# Patient Record
Sex: FEMALE | Race: WHITE | URBAN - METROPOLITAN AREA
[De-identification: names, ages, dates, MRNs, and addresses within clinical notes are randomized per-mention and may not be internally consistent; named-entity substitution may affect disease eponyms.]

---

## 2017-03-02 ENCOUNTER — TRANSFERRED RECORDS (OUTPATIENT)
Dept: HEALTH INFORMATION MANAGEMENT | Facility: CLINIC | Age: 72
End: 2017-03-02

## 2017-03-23 ENCOUNTER — TRANSFERRED RECORDS (OUTPATIENT)
Dept: HEALTH INFORMATION MANAGEMENT | Facility: CLINIC | Age: 72
End: 2017-03-23

## 2018-08-07 ENCOUNTER — TRANSFERRED RECORDS (OUTPATIENT)
Dept: HEALTH INFORMATION MANAGEMENT | Facility: CLINIC | Age: 73
End: 2018-08-07

## 2018-09-05 ENCOUNTER — TRANSFERRED RECORDS (OUTPATIENT)
Dept: HEALTH INFORMATION MANAGEMENT | Facility: CLINIC | Age: 73
End: 2018-09-05

## 2018-11-21 ENCOUNTER — TRANSFERRED RECORDS (OUTPATIENT)
Dept: HEALTH INFORMATION MANAGEMENT | Facility: CLINIC | Age: 73
End: 2018-11-21

## 2018-11-27 ENCOUNTER — MEDICAL CORRESPONDENCE (OUTPATIENT)
Dept: HEALTH INFORMATION MANAGEMENT | Facility: CLINIC | Age: 73
End: 2018-11-27

## 2018-11-28 ENCOUNTER — MEDICAL CORRESPONDENCE (OUTPATIENT)
Dept: HEALTH INFORMATION MANAGEMENT | Facility: CLINIC | Age: 73
End: 2018-11-28

## 2019-02-11 ENCOUNTER — MEDICAL CORRESPONDENCE (OUTPATIENT)
Dept: HEALTH INFORMATION MANAGEMENT | Facility: CLINIC | Age: 74
End: 2019-02-11

## 2019-02-25 ENCOUNTER — DOCUMENTATION ONLY (OUTPATIENT)
Dept: CARE COORDINATION | Facility: CLINIC | Age: 74
End: 2019-02-25

## 2019-03-11 DIAGNOSIS — M54.50 LOW BACK PAIN RADIATING TO LEFT LEG: Primary | ICD-10-CM

## 2019-03-11 DIAGNOSIS — M79.605 LOW BACK PAIN RADIATING TO LEFT LEG: Primary | ICD-10-CM

## 2019-10-11 DIAGNOSIS — M79.605 LOW BACK PAIN RADIATING TO LEFT LEG: Primary | ICD-10-CM

## 2019-10-11 DIAGNOSIS — M54.50 LOW BACK PAIN RADIATING TO LEFT LEG: Primary | ICD-10-CM

## 2019-10-13 ASSESSMENT — ENCOUNTER SYMPTOMS
HEARTBURN: 1
ABDOMINAL PAIN: 1
INCREASED ENERGY: 1
BLOATING: 1
DECREASED APPETITE: 0
BOWEL INCONTINENCE: 1
ALTERED TEMPERATURE REGULATION: 1
DOUBLE VISION: 0
INSOMNIA: 1
PANIC: 1
SINUS PAIN: 0
MUSCLE WEAKNESS: 1
EYE PAIN: 0
TROUBLE SWALLOWING: 0
VOMITING: 0
NERVOUS/ANXIOUS: 1
NECK MASS: 0
NIGHT SWEATS: 0
HOARSE VOICE: 0
CHILLS: 1
SMELL DISTURBANCE: 0
DEPRESSION: 1
MUSCLE CRAMPS: 1
STIFFNESS: 1
TASTE DISTURBANCE: 0
JAUNDICE: 0
NECK PAIN: 0
EYE IRRITATION: 0
DECREASED CONCENTRATION: 0
BACK PAIN: 1
POLYDIPSIA: 1
CONSTIPATION: 1
ARTHRALGIAS: 1
DIARRHEA: 1
HALLUCINATIONS: 0
MYALGIAS: 1
RECTAL PAIN: 1
NAUSEA: 1
WEIGHT GAIN: 0
BLOOD IN STOOL: 0
JOINT SWELLING: 0
EYE WATERING: 0
POLYPHAGIA: 0
FEVER: 0
EYE REDNESS: 0
FATIGUE: 1
SORE THROAT: 0
WEIGHT LOSS: 0
SINUS CONGESTION: 0

## 2019-10-17 ENCOUNTER — OFFICE VISIT (OUTPATIENT)
Dept: NEUROSURGERY | Facility: CLINIC | Age: 74
End: 2019-10-17
Payer: COMMERCIAL

## 2019-10-17 ENCOUNTER — ANCILLARY PROCEDURE (OUTPATIENT)
Dept: GENERAL RADIOLOGY | Facility: CLINIC | Age: 74
End: 2019-10-17
Attending: NEUROLOGICAL SURGERY
Payer: COMMERCIAL

## 2019-10-17 VITALS
DIASTOLIC BLOOD PRESSURE: 85 MMHG | HEART RATE: 103 BPM | SYSTOLIC BLOOD PRESSURE: 153 MMHG | OXYGEN SATURATION: 96 % | RESPIRATION RATE: 16 BRPM | BODY MASS INDEX: 28.99 KG/M2 | HEIGHT: 65 IN | WEIGHT: 174 LBS

## 2019-10-17 DIAGNOSIS — M79.7 FIBROMYALGIA: ICD-10-CM

## 2019-10-17 DIAGNOSIS — M79.605 LOW BACK PAIN RADIATING TO LEFT LEG: ICD-10-CM

## 2019-10-17 DIAGNOSIS — M54.50 LOW BACK PAIN RADIATING TO LEFT LEG: ICD-10-CM

## 2019-10-17 DIAGNOSIS — M79.605 LEFT LEG PAIN: Primary | ICD-10-CM

## 2019-10-17 PROBLEM — M19.90 INFLAMMATORY ARTHRITIS: Status: ACTIVE | Noted: 2018-06-27

## 2019-10-17 PROBLEM — M51.369 DDD (DEGENERATIVE DISC DISEASE), LUMBAR: Status: ACTIVE | Noted: 2019-07-22

## 2019-10-17 PROBLEM — M47.819 DEGENERATIVE ARTHROPATHY OF SPINAL FACET JOINT: Status: ACTIVE | Noted: 2019-07-22

## 2019-10-17 PROBLEM — M25.552 LEFT HIP PAIN: Status: ACTIVE | Noted: 2018-06-27

## 2019-10-17 PROBLEM — M25.562 LEFT KNEE PAIN: Status: ACTIVE | Noted: 2018-06-27

## 2019-10-17 PROBLEM — Z79.899 CONTROLLED SUBSTANCE AGREEMENT SIGNED: Status: ACTIVE | Noted: 2019-07-24

## 2019-10-17 PROBLEM — K58.1 IRRITABLE BOWEL SYNDROME WITH CONSTIPATION: Status: ACTIVE | Noted: 2017-04-15

## 2019-10-17 PROBLEM — R11.0 NAUSEA: Status: ACTIVE | Noted: 2018-06-27

## 2019-10-17 RX ORDER — LORAZEPAM 2 MG/1
TABLET ORAL
Refills: 0 | COMMUNITY
Start: 2019-06-08

## 2019-10-17 RX ORDER — GABAPENTIN 300 MG/1
CAPSULE ORAL
Refills: 11 | COMMUNITY
Start: 2019-08-24

## 2019-10-17 RX ORDER — AMITRIPTYLINE HYDROCHLORIDE 150 MG/1
TABLET ORAL
Refills: 1 | COMMUNITY
Start: 2019-07-24

## 2019-10-17 RX ORDER — TRAMADOL HYDROCHLORIDE 50 MG/1
TABLET ORAL
COMMUNITY
Start: 2019-10-14

## 2019-10-17 RX ORDER — SUCRALFATE 1 G/1
TABLET ORAL
Refills: 0 | COMMUNITY
Start: 2019-06-12

## 2019-10-17 RX ORDER — FAMOTIDINE 20 MG
TABLET ORAL
COMMUNITY

## 2019-10-17 RX ORDER — UBIDECARENONE 100 MG
CAPSULE ORAL DAILY
COMMUNITY

## 2019-10-17 RX ORDER — GABAPENTIN 100 MG/1
CAPSULE ORAL
Refills: 3 | COMMUNITY
Start: 2019-07-09

## 2019-10-17 RX ORDER — LORATADINE 10 MG/1
10 TABLET ORAL
COMMUNITY

## 2019-10-17 RX ORDER — ESTRADIOL 1 MG/1
TABLET ORAL
Refills: 3 | COMMUNITY
Start: 2019-08-05

## 2019-10-17 RX ORDER — ESTAZOLAM 2 MG/1
TABLET ORAL
Refills: 1 | COMMUNITY
Start: 2019-10-15

## 2019-10-17 RX ORDER — LEVOTHYROXINE SODIUM 100 UG/1
100 TABLET ORAL
COMMUNITY
Start: 2012-11-18

## 2019-10-17 ASSESSMENT — MIFFLIN-ST. JEOR: SCORE: 1295.14

## 2019-10-17 NOTE — PROGRESS NOTES
"    Neurosurgery Clinic Note    Chief Complaint: left leg pain    History of Present Illness:  It was a pleasure to evaluate Caroline Barber in clinic today at the kind referral of Charles Oliva MD  [facility]  [address] [place] [address].    Caroline Barber is a 74 year old female presenting with a multitude of painful points in entire body, here specifically for left leg pain in entire left leg to foot, exacerbated by walking, standing, sitting too long, no relieving factors. Pain is burning and constant. Patient has had Lyme disease in the past and also has fibromyalgia. She does also have back pain that she states is relieved by sitting. The pain in her left leg got worse after she got left sided knee and hip injections in the past several months    Records reviewed from Charles Oliva MD who recommended nonoperative treatment    The patient describes \"pain\" in her back if she wears regular pants and can only wear sweatpants because of this.          Review of Systems   Answers for HPI/ROS submitted by the patient on 10/13/2019   General Symptoms: Yes  Skin Symptoms: No  HENT Symptoms: Yes  EYE SYMPTOMS: Yes  HEART SYMPTOMS: No  LUNG SYMPTOMS: No  INTESTINAL SYMPTOMS: Yes  URINARY SYMPTOMS: No  GYNECOLOGIC SYMPTOMS: No  BREAST SYMPTOMS: No  SKELETAL SYMPTOMS: Yes  BLOOD SYMPTOMS: No  NERVOUS SYSTEM SYMPTOMS: No  MENTAL HEALTH SYMPTOMS: Yes  Fever: No  Loss of appetite: No  Weight loss: No  Weight gain: No  Fatigue: Yes  Night sweats: No  Chills: Yes  Increased stress: Yes  Excessive hunger: No  Excessive thirst: Yes  Feeling hot or cold when others believe the temperature is normal: Yes  Loss of height: Yes  Post-operative complications: No  Surgical site pain: No  Hallucinations: No  Change in or Loss of Energy: Yes  Hyperactivity: No  Confusion: No  Ear pain: No  Ear discharge: No  Hearing loss: Yes  Tinnitus: Yes  Nosebleeds: No  Congestion: No  Sinus pain: No  Trouble swallowing: No   Voice " hoarseness: No  Mouth sores: No  Sore throat: No  Tooth pain: No  Gum tenderness: No  Bleeding gums: No  Change in taste: No  Change in sense of smell: No  Dry mouth: Yes  Hearing aid used: No  Neck lump: No  Eye pain: No  Vision loss: No  Dry eyes: Yes  Watery eyes: No  Eye bulging: No  Double vision: No  Flashing of lights: No  Spots: No  Floaters: Yes  Redness: No  Crossed eyes: No  Tunnel Vision: No  Yellowing of eyes: No  Eye irritation: No  Heart burn or indigestion: Yes  Nausea: Yes  Vomiting: No  Abdominal pain: Yes  Bloating: Yes  Constipation: Yes  Diarrhea: Yes  Blood in stool: No  Black stools: No  Rectal or Anal pain: Yes  Fecal incontinence: Yes  Yellowing of skin or eyes: No  Vomit with blood: No  Change in stools: No  Back pain: Yes  Muscle aches: Yes  Neck pain: No  Swollen joints: No  Joint pain: Yes  Bone pain: Yes  Muscle cramps: Yes  Muscle weakness: Yes  Joint stiffness: Yes  Bone fracture: No  Nervous or Anxious: Yes  Depression: Yes  Trouble sleeping: Yes  Trouble thinking or concentrating: No  Mood changes: Yes  Panic attacks: Yes      Past Medical History-  Lyme disease  Depression  Fibromyalgia  Neurogenic bladder  Rectocele with fecal incontinence  IBS  Hypothyroidism      Past Surgical History-  No prior spine surgery        Social History     Socioeconomic History     Marital status: Single     Spouse name: None     Number of children: None     Years of education: None     Highest education level: None   Occupational History     None   Social Needs     Financial resource strain: None     Food insecurity:     Worry: None     Inability: None     Transportation needs:     Medical: None     Non-medical: None   Tobacco Use     Smoking status: Never Smoker     Smokeless tobacco: Never Used     Tobacco comment: PASSIVE EXPOSURE- EX-  SMOKED IN HOUSE   Substance and Sexual Activity     Alcohol use: Not Currently     Drug use: Not Currently     Sexual activity: None   Lifestyle      Physical activity:     Days per week: None     Minutes per session: None     Stress: None   Relationships     Social connections:     Talks on phone: None     Gets together: None     Attends Jew service: None     Active member of club or organization: None     Attends meetings of clubs or organizations: None     Relationship status: None     Intimate partner violence:     Fear of current or ex partner: None     Emotionally abused: None     Physically abused: None     Forced sexual activity: None   Other Topics Concern     None   Social History Narrative     None       Family History- mother had scoliosis      IMAGING per my own measurement and interpretation:  Xrays:scoliosis 13 degrees right L1-L5       X-ray Lumbar Spine G/e 4 Views (ap, Lateral, Flexion, Extension - Standing Views Preferred) [img69]    Result Date: 10/17/2019  4 views lumbar spine radiographs 10/17/2019 10:31 AM History: Low back pain radiating to left leg; Low back pain radiating to left leg Comparison: Outside MRI 8/13/2019 Findings: Standing  AP, lateral including flexion extension views of the lumbar spine were obtained. 5  lumbar type vertebral bodies are assumed for the purpose of this dictation. Hypoplastic/aplastic T12 ribs presumed. There is no acute osseous abnormality.  Mild to moderate convex left curvature of the thoracolumbar/lumbar spine which compromises assessment of disc spaces and vertebral body heights. There is multilevel degenerative changes of the lumbar spine, most pronounced at L3-L4 with significant disc space loss. There is also lower lumbar predominant facet arthropathy. 5 mm of anterolisthesis of L4 on L5 in flexion which reduces to 3 mm in extension. Trace retrolisthesis of L1 on L2 without evidence of motion. Spinous process hypertrophy as can be seen in Baastrup's disease. The visualized bowel gas pattern is non-obstructive. Vascular calcifications. Degenerative changes of the hips partially visualized,  "severe on the left.     Impression: 1.  No acute osseous abnormality. 2.  Multilevel degenerative changes most pronounced at L3-L4. 3. Approximately 2 mm of motion across L4-L5 in flexion/extension. 4. Partially visualized degenerative changes of the hips, severe on the left. JUSTIN DREW MD (Joe)    MRI lumbar spine without any significant left sided foraminal or lateral recess stenosis          Vitamin D:  Vitamin D Deficiency Screening Results:  No results found for: VITDT  No results found for: OMQ622, XIAN132, [id number], VITD3, D2VIT, D3VIT, DTOT, [id number], [id number], [id number], [id number], [id number], [id number]      Nutritional Status:  Estimated body mass index is 28.96 kg/m  as calculated from the following:    Height as of this encounter: 1.651 m (5' 5\").    Weight as of this encounter: 78.9 kg (174 lb).    No results found for: ALBUMIN    Diabetes Screening:  No results found for: A1C    Nicotine Usage:    No                Physical Exam   BP (!) 153/85   Pulse 103   Resp 16   Ht 1.651 m (5' 5\")   Wt 78.9 kg (174 lb)   SpO2 96%   BMI 28.96 kg/m    Constitutional: Oriented to person, place, and time. Appears well-developed and well-nourished. Cooperative. No distress.   HENT:   Head: Normocephalic and atraumatic.   Eyes: Conjunctivae are normal.  Neck: Normal range of motion. Neck supple. No spinous process tenderness and no muscular tenderness present. No tracheal deviation present.  Cardiovascular: Normal rate and regular rhythm.    Pulmonary/Chest: Effort normal and breath sounds normal.  Abdominal: Soft. Bowel sounds are normal. Exhibits no distension. There is no tenderness.   Musculoskeletal:   Cervical flexion-extension range of motion: normal  Lumbar flexion/extension range of motion: extension to 15 degrees, unable to flex more than 25 degrees     Neurological: alert and oriented to person, place, and time.   No cranial nerve deficit   sensory deficit none  Gait " antalgic      Reflex Scores:        Diffusely areflexic    STRENGTH LEFT RIGHT   Deltoid 5 5   Bicep 5 5   Wrist Extensor 5 5   Tricep 5 5   Finger flexion 5 5   Finger abduction 5 5    5 5       Hip Flexion     5     5   Knee Extension 5 5   Ankle Dorsiflexion 5 5   Extensor Hallucis Longus 5 5   Plantar Flexion 5 5   Foot eversion 5 5   Foot inversion 5 5     No Lhermitte's, No Spurling's  No Alec's   No ankle clonus    Straight leg raise and crossed straight leg raise are negative        Skin: Skin is warm, dry and intact.   Psychiatric: Normal mood and affect. Speech is normal and behavior is normal.        ASSESSMENT:  Caroline Barber is a 74 year old female with left leg pain not consistent with radicular pain and not correlating with MRI lumbar spine    PLAN:    The patient has atypical pain triggers- she initially gasped with pain when I palpated behind her left knee, then was able to deeply palpate herself without any pain.    There is no neuroforaminal stenosis on the left at L4-5 or L5-S1 to explain her left leg pain, and I do not appreciate significant enough lateral recess stenosis to cause this at proximal levels. The patient's fibromyalgia or Lyme disease may be contributing factors and I recommended that she get evaluated by a neurologist and made a referral for her.  There is not a clear target for any surgical intervention therefore I do not recommend surgery    No scheduled followup needed with me      Anitha Coppola MD    [facility] Department of Neurosurgery  Complex Spinal Deformity, Scoliosis, and Minimally Invasive Spine Surgery Specialist  Office: [phone]    10/17/2019      I performed independent visualization of radiographic imaging and entered my own interpretation, reviewed and/or ordered tests in radiology, made the decision to obtain old records and/or history from someone other than the patient and Reviewed and summarized old records and/or  discussed this case with another health care provider

## 2019-10-17 NOTE — NURSING NOTE
Chief Complaint   Patient presents with     Back Pain     [facility] NEW LBP- RADIATION DOWN LEFT LEG       Keshav Dillon, EMT

## 2019-10-17 NOTE — LETTER
"10/17/2019       RE: Caroline Barber  [address] [place] [address]     Dear Colleague,    Thank you for referring your patient, Caroline Barber, to the [facility] NEUROSURGERY at [facility]. Please see a copy of my visit note below.        Neurosurgery Clinic Note    Chief Complaint: left leg pain    History of Present Illness:  It was a pleasure to evaluate Caroline Barber in clinic today at the kind referral of Charles Oliva MD  [facility]  [address] [place] [address].    Caroline Barber is a 74 year old female presenting with a multitude of painful points in entire body, here specifically for left leg pain in entire left leg to foot, exacerbated by walking, standing, sitting too long, no relieving factors. Pain is burning and constant. Patient has had Lyme disease in the past and also has fibromyalgia. She does also have back pain that she states is relieved by sitting. The pain in her left leg got worse after she got left sided knee and hip injections in the past several months    Records reviewed from Charles Oliva MD who recommended nonoperative treatment    The patient describes \"pain\" in her back if she wears regular pants and can only wear sweatpants because of this.          Review of Systems   Answers for HPI/ROS submitted by the patient on 10/13/2019   General Symptoms: Yes  Skin Symptoms: No  HENT Symptoms: Yes  EYE SYMPTOMS: Yes  HEART SYMPTOMS: No  LUNG SYMPTOMS: No  INTESTINAL SYMPTOMS: Yes  URINARY SYMPTOMS: No  GYNECOLOGIC SYMPTOMS: No  BREAST SYMPTOMS: No  SKELETAL SYMPTOMS: Yes  BLOOD SYMPTOMS: No  NERVOUS SYSTEM SYMPTOMS: No  MENTAL HEALTH SYMPTOMS: Yes  Fever: No  Loss of appetite: No  Weight loss: No  Weight gain: No  Fatigue: Yes  Night sweats: No  Chills: Yes  Increased stress: Yes  Excessive hunger: No  Excessive thirst: Yes  Feeling hot or cold when others believe the temperature is normal: Yes  Loss of height: " Yes  Post-operative complications: No  Surgical site pain: No  Hallucinations: No  Change in or Loss of Energy: Yes  Hyperactivity: No  Confusion: No  Ear pain: No  Ear discharge: No  Hearing loss: Yes  Tinnitus: Yes  Nosebleeds: No  Congestion: No  Sinus pain: No  Trouble swallowing: No   Voice hoarseness: No  Mouth sores: No  Sore throat: No  Tooth pain: No  Gum tenderness: No  Bleeding gums: No  Change in taste: No  Change in sense of smell: No  Dry mouth: Yes  Hearing aid used: No  Neck lump: No  Eye pain: No  Vision loss: No  Dry eyes: Yes  Watery eyes: No  Eye bulging: No  Double vision: No  Flashing of lights: No  Spots: No  Floaters: Yes  Redness: No  Crossed eyes: No  Tunnel Vision: No  Yellowing of eyes: No  Eye irritation: No  Heart burn or indigestion: Yes  Nausea: Yes  Vomiting: No  Abdominal pain: Yes  Bloating: Yes  Constipation: Yes  Diarrhea: Yes  Blood in stool: No  Black stools: No  Rectal or Anal pain: Yes  Fecal incontinence: Yes  Yellowing of skin or eyes: No  Vomit with blood: No  Change in stools: No  Back pain: Yes  Muscle aches: Yes  Neck pain: No  Swollen joints: No  Joint pain: Yes  Bone pain: Yes  Muscle cramps: Yes  Muscle weakness: Yes  Joint stiffness: Yes  Bone fracture: No  Nervous or Anxious: Yes  Depression: Yes  Trouble sleeping: Yes  Trouble thinking or concentrating: No  Mood changes: Yes  Panic attacks: Yes      Past Medical History-  Lyme disease  Depression  Fibromyalgia  Neurogenic bladder  Rectocele with fecal incontinence  IBS  Hypothyroidism      Past Surgical History-  No prior spine surgery        Social History     Socioeconomic History     Marital status: Single     Spouse name: None     Number of children: None     Years of education: None     Highest education level: None   Occupational History     None   Social Needs     Financial resource strain: None     Food insecurity:     Worry: None     Inability: None     Transportation needs:     Medical: None      Non-medical: None   Tobacco Use     Smoking status: Never Smoker     Smokeless tobacco: Never Used     Tobacco comment: PASSIVE EXPOSURE- EX-  SMOKED IN HOUSE   Substance and Sexual Activity     Alcohol use: Not Currently     Drug use: Not Currently     Sexual activity: None   Lifestyle     Physical activity:     Days per week: None     Minutes per session: None     Stress: None   Relationships     Social connections:     Talks on phone: None     Gets together: None     Attends Tenriism service: None     Active member of club or organization: None     Attends meetings of clubs or organizations: None     Relationship status: None     Intimate partner violence:     Fear of current or ex partner: None     Emotionally abused: None     Physically abused: None     Forced sexual activity: None   Other Topics Concern     None   Social History Narrative     None       Family History- mother had scoliosis      IMAGING per my own measurement and interpretation:  Xrays:scoliosis 13 degrees right L1-L5       X-ray Lumbar Spine G/e 4 Views (ap, Lateral, Flexion, Extension - Standing Views Preferred) [img69]    Result Date: 10/17/2019  4 views lumbar spine radiographs 10/17/2019 10:31 AM History: Low back pain radiating to left leg; Low back pain radiating to left leg Comparison: Outside MRI 8/13/2019 Findings: Standing  AP, lateral including flexion extension views of the lumbar spine were obtained. 5  lumbar type vertebral bodies are assumed for the purpose of this dictation. Hypoplastic/aplastic T12 ribs presumed. There is no acute osseous abnormality.  Mild to moderate convex left curvature of the thoracolumbar/lumbar spine which compromises assessment of disc spaces and vertebral body heights. There is multilevel degenerative changes of the lumbar spine, most pronounced at L3-L4 with significant disc space loss. There is also lower lumbar predominant facet arthropathy. 5 mm of anterolisthesis of L4 on L5 in flexion  "which reduces to 3 mm in extension. Trace retrolisthesis of L1 on L2 without evidence of motion. Spinous process hypertrophy as can be seen in Baastrup's disease. The visualized bowel gas pattern is non-obstructive. Vascular calcifications. Degenerative changes of the hips partially visualized, severe on the left.     Impression: 1.  No acute osseous abnormality. 2.  Multilevel degenerative changes most pronounced at L3-L4. 3. Approximately 2 mm of motion across L4-L5 in flexion/extension. 4. Partially visualized degenerative changes of the hips, severe on the left. JUSTIN DREW MD (Joe)    MRI lumbar spine without any significant left sided foraminal or lateral recess stenosis          Vitamin D:  Vitamin D Deficiency Screening Results:  No results found for: VITDT  No results found for: WCX226, IRZL747, [id number], VITD3, D2VIT, D3VIT, DTOT, [id number], [id number], [id number], [id number], [id number], [id number]      Nutritional Status:  Estimated body mass index is 28.96 kg/m  as calculated from the following:    Height as of this encounter: 1.651 m (5' 5\").    Weight as of this encounter: 78.9 kg (174 lb).    No results found for: ALBUMIN    Diabetes Screening:  No results found for: A1C    Nicotine Usage:    No                Physical Exam   BP (!) 153/85   Pulse 103   Resp 16   Ht 1.651 m (5' 5\")   Wt 78.9 kg (174 lb)   SpO2 96%   BMI 28.96 kg/m     Constitutional: Oriented to person, place, and time. Appears well-developed and well-nourished. Cooperative. No distress.   HENT:   Head: Normocephalic and atraumatic.   Eyes: Conjunctivae are normal.  Neck: Normal range of motion. Neck supple. No spinous process tenderness and no muscular tenderness present. No tracheal deviation present.  Cardiovascular: Normal rate and regular rhythm.    Pulmonary/Chest: Effort normal and breath sounds normal.  Abdominal: Soft. Bowel sounds are normal. Exhibits no distension. There is no tenderness. "   Musculoskeletal:   Cervical flexion-extension range of motion: normal  Lumbar flexion/extension range of motion: extension to 15 degrees, unable to flex more than 25 degrees     Neurological: alert and oriented to person, place, and time.   No cranial nerve deficit   sensory deficit none  Gait antalgic      Reflex Scores:        Diffusely areflexic    STRENGTH LEFT RIGHT   Deltoid 5 5   Bicep 5 5   Wrist Extensor 5 5   Tricep 5 5   Finger flexion 5 5   Finger abduction 5 5    5 5       Hip Flexion     5     5   Knee Extension 5 5   Ankle Dorsiflexion 5 5   Extensor Hallucis Longus 5 5   Plantar Flexion 5 5   Foot eversion 5 5   Foot inversion 5 5     No Lhermitte's, No Spurling's  No Alec's   No ankle clonus    Straight leg raise and crossed straight leg raise are negative        Skin: Skin is warm, dry and intact.   Psychiatric: Normal mood and affect. Speech is normal and behavior is normal.        ASSESSMENT:  Caroline Barber is a 74 year old female with left leg pain not consistent with radicular pain and not correlating with MRI lumbar spine    PLAN:    The patient has atypical pain triggers- she initially gasped with pain when I palpated behind her left knee, then was able to deeply palpate herself without any pain.    There is no neuroforaminal stenosis on the left at L4-5 or L5-S1 to explain her left leg pain, and I do not appreciate significant enough lateral recess stenosis to cause this at proximal levels. The patient's fibromyalgia or Lyme disease may be contributing factors and I recommended that she get evaluated by a neurologist and made a referral for her.  There is not a clear target for any surgical intervention therefore I do not recommend surgery    No scheduled followup needed with me      Anitha Coppola MD    [facility] Department of Neurosurgery  Complex Spinal Deformity, Scoliosis, and Minimally Invasive Spine Surgery Specialist  Office:  [phone]    10/17/2019      I performed independent visualization of radiographic imaging and entered my own interpretation, reviewed and/or ordered tests in radiology, made the decision to obtain old records and/or history from someone other than the patient and Reviewed and summarized old records and/or discussed this case with another health care provider

## 2019-10-28 ENCOUNTER — HEALTH MAINTENANCE LETTER (OUTPATIENT)
Age: 74
End: 2019-10-28

## 2020-03-15 ENCOUNTER — HEALTH MAINTENANCE LETTER (OUTPATIENT)
Age: 75
End: 2020-03-15

## 2021-01-14 ENCOUNTER — HEALTH MAINTENANCE LETTER (OUTPATIENT)
Age: 76
End: 2021-01-14

## 2021-05-09 ENCOUNTER — HEALTH MAINTENANCE LETTER (OUTPATIENT)
Age: 76
End: 2021-05-09

## 2021-10-24 ENCOUNTER — HEALTH MAINTENANCE LETTER (OUTPATIENT)
Age: 76
End: 2021-10-24

## 2022-04-10 ENCOUNTER — HEALTH MAINTENANCE LETTER (OUTPATIENT)
Age: 77
End: 2022-04-10

## 2022-06-05 ENCOUNTER — HEALTH MAINTENANCE LETTER (OUTPATIENT)
Age: 77
End: 2022-06-05

## 2022-10-15 ENCOUNTER — HEALTH MAINTENANCE LETTER (OUTPATIENT)
Age: 77
End: 2022-10-15

## 2023-06-11 ENCOUNTER — HEALTH MAINTENANCE LETTER (OUTPATIENT)
Age: 78
End: 2023-06-11